# Patient Record
Sex: FEMALE | Race: WHITE | URBAN - METROPOLITAN AREA
[De-identification: names, ages, dates, MRNs, and addresses within clinical notes are randomized per-mention and may not be internally consistent; named-entity substitution may affect disease eponyms.]

---

## 2018-03-06 ENCOUNTER — APPOINTMENT (RX ONLY)
Dept: URBAN - METROPOLITAN AREA CLINIC 86 | Facility: CLINIC | Age: 73
Setting detail: DERMATOLOGY
End: 2018-03-06

## 2018-03-06 DIAGNOSIS — L82.1 OTHER SEBORRHEIC KERATOSIS: ICD-10-CM

## 2018-03-06 DIAGNOSIS — L82.0 INFLAMED SEBORRHEIC KERATOSIS: ICD-10-CM

## 2018-03-06 PROBLEM — L85.3 XEROSIS CUTIS: Status: ACTIVE | Noted: 2018-03-06

## 2018-03-06 PROBLEM — E78.5 HYPERLIPIDEMIA, UNSPECIFIED: Status: ACTIVE | Noted: 2018-03-06

## 2018-03-06 PROBLEM — E13.9 OTHER SPECIFIED DIABETES MELLITUS WITHOUT COMPLICATIONS: Status: ACTIVE | Noted: 2018-03-06

## 2018-03-06 PROBLEM — L20.84 INTRINSIC (ALLERGIC) ECZEMA: Status: ACTIVE | Noted: 2018-03-06

## 2018-03-06 PROCEDURE — 17110 DESTRUCTION B9 LES UP TO 14: CPT

## 2018-03-06 PROCEDURE — ? LIQUID NITROGEN

## 2018-03-06 PROCEDURE — 99202 OFFICE O/P NEW SF 15 MIN: CPT | Mod: 25

## 2018-03-06 PROCEDURE — ? COUNSELING

## 2018-03-06 ASSESSMENT — LOCATION ZONE DERM
LOCATION ZONE: ARM
LOCATION ZONE: FACE
LOCATION ZONE: FACE

## 2018-03-06 ASSESSMENT — LOCATION SIMPLE DESCRIPTION DERM
LOCATION SIMPLE: LEFT UPPER ARM
LOCATION SIMPLE: LEFT CHEEK
LOCATION SIMPLE: LEFT CHEEK

## 2018-03-06 ASSESSMENT — LOCATION DETAILED DESCRIPTION DERM
LOCATION DETAILED: LEFT INFERIOR LATERAL MALAR CHEEK
LOCATION DETAILED: LEFT ANTERIOR PROXIMAL UPPER ARM
LOCATION DETAILED: LEFT INFERIOR MEDIAL MALAR CHEEK

## 2018-03-28 ENCOUNTER — APPOINTMENT (RX ONLY)
Dept: URBAN - METROPOLITAN AREA CLINIC 86 | Facility: CLINIC | Age: 73
Setting detail: DERMATOLOGY
End: 2018-03-28

## 2018-03-28 DIAGNOSIS — L57.0 ACTINIC KERATOSIS: ICD-10-CM

## 2018-03-28 DIAGNOSIS — L82.1 OTHER SEBORRHEIC KERATOSIS: ICD-10-CM

## 2018-03-28 DIAGNOSIS — D22 MELANOCYTIC NEVI: ICD-10-CM

## 2018-03-28 DIAGNOSIS — L85.3 XEROSIS CUTIS: ICD-10-CM

## 2018-03-28 DIAGNOSIS — D18.0 HEMANGIOMA: ICD-10-CM

## 2018-03-28 DIAGNOSIS — L81.4 OTHER MELANIN HYPERPIGMENTATION: ICD-10-CM

## 2018-03-28 PROBLEM — D22.5 MELANOCYTIC NEVI OF TRUNK: Status: ACTIVE | Noted: 2018-03-28

## 2018-03-28 PROBLEM — D18.01 HEMANGIOMA OF SKIN AND SUBCUTANEOUS TISSUE: Status: ACTIVE | Noted: 2018-03-28

## 2018-03-28 PROCEDURE — 17003 DESTRUCT PREMALG LES 2-14: CPT

## 2018-03-28 PROCEDURE — 17000 DESTRUCT PREMALG LESION: CPT

## 2018-03-28 PROCEDURE — 99214 OFFICE O/P EST MOD 30 MIN: CPT | Mod: 25

## 2018-03-28 PROCEDURE — ? COUNSELING

## 2018-03-28 PROCEDURE — ? LIQUID NITROGEN

## 2018-03-28 ASSESSMENT — LOCATION SIMPLE DESCRIPTION DERM
LOCATION SIMPLE: RIGHT CHEEK
LOCATION SIMPLE: RIGHT SHOULDER
LOCATION SIMPLE: CHEST
LOCATION SIMPLE: ABDOMEN
LOCATION SIMPLE: LEFT LIP
LOCATION SIMPLE: LEFT CHEEK
LOCATION SIMPLE: LEFT PRETIBIAL REGION
LOCATION SIMPLE: RIGHT PRETIBIAL REGION

## 2018-03-28 ASSESSMENT — LOCATION DETAILED DESCRIPTION DERM
LOCATION DETAILED: RIGHT PROXIMAL PRETIBIAL REGION
LOCATION DETAILED: EPIGASTRIC SKIN
LOCATION DETAILED: RIGHT CENTRAL MALAR CHEEK
LOCATION DETAILED: RIGHT MEDIAL SUPERIOR CHEST
LOCATION DETAILED: RIGHT ANTERIOR SHOULDER
LOCATION DETAILED: PERIUMBILICAL SKIN
LOCATION DETAILED: LEFT SUPERIOR LATERAL BUCCAL CHEEK
LOCATION DETAILED: UMBILICUS
LOCATION DETAILED: LEFT PROXIMAL PRETIBIAL REGION
LOCATION DETAILED: LEFT UPPER CUTANEOUS LIP

## 2018-03-28 ASSESSMENT — LOCATION ZONE DERM
LOCATION ZONE: TRUNK
LOCATION ZONE: ARM
LOCATION ZONE: LIP
LOCATION ZONE: FACE
LOCATION ZONE: LEG

## 2018-04-27 ENCOUNTER — IMPORTED ENCOUNTER (OUTPATIENT)
Dept: URBAN - METROPOLITAN AREA CLINIC 50 | Facility: CLINIC | Age: 73
End: 2018-04-27

## 2018-05-02 ENCOUNTER — IMPORTED ENCOUNTER (OUTPATIENT)
Dept: URBAN - METROPOLITAN AREA CLINIC 50 | Facility: CLINIC | Age: 73
End: 2018-05-02

## 2018-05-04 ENCOUNTER — IMPORTED ENCOUNTER (OUTPATIENT)
Dept: URBAN - METROPOLITAN AREA CLINIC 50 | Facility: CLINIC | Age: 73
End: 2018-05-04

## 2018-11-09 ENCOUNTER — IMPORTED ENCOUNTER (OUTPATIENT)
Dept: URBAN - METROPOLITAN AREA CLINIC 50 | Facility: CLINIC | Age: 73
End: 2018-11-09

## 2019-05-24 ENCOUNTER — IMPORTED ENCOUNTER (OUTPATIENT)
Dept: URBAN - METROPOLITAN AREA CLINIC 50 | Facility: CLINIC | Age: 74
End: 2019-05-24

## 2019-05-24 NOTE — PATIENT DISCUSSION
"""Dr. Paralee Baumgarten to review test results with patient at todays appoinment.  IOP controlled OU

## 2020-05-29 ENCOUNTER — IMPORTED ENCOUNTER (OUTPATIENT)
Dept: URBAN - METROPOLITAN AREA CLINIC 50 | Facility: CLINIC | Age: 75
End: 2020-05-29

## 2021-04-17 ASSESSMENT — VISUAL ACUITY
OS_OTHER: 20/50. 20/200.
OD_CC: J1+@ 16 IN
OD_OTHER: 20/60. 20/70.
OS_CC: J1+@ 15 IN
OS_PH: 20/40-1
OS_CC: J1+@ 14 IN
OD_CC: J1+@ 15 IN
OD_CC: 20/25-3
OD_OTHER: 20/400. 20/>400.
OS_BAT: 20/50
OD_CC: 20/40-2
OS_OTHER: 20/50. 20/70.
OD_CC: 20/30-2
OD_OTHER: 20/60. >20/400.
OS_CC: 20/40+2
OD_BAT: 20/60
OS_CC: 20/40
OD_CC: 20/30
OD_CC: J1+@ 14 IN
OD_PH: 20/40
OS_CC: 20/40+2
OS_BAT: 20/50
OS_CC: J1+@ 16 IN
OD_BAT: 20/400
OD_BAT: 20/60
OS_CC: 20/40-1

## 2021-04-17 ASSESSMENT — PACHYMETRY
OS_CT_UM: 652
OD_CT_UM: 632
OS_CT_UM: 652
OD_CT_UM: 632

## 2021-04-17 ASSESSMENT — TONOMETRY
OD_IOP_MMHG: 16
OS_IOP_MMHG: 12
OD_IOP_MMHG: 15
OS_IOP_MMHG: 16
OD_IOP_MMHG: 14
OS_IOP_MMHG: 18
OS_IOP_MMHG: 17
OD_IOP_MMHG: 10
OS_IOP_MMHG: 13
OD_IOP_MMHG: 20
OS_IOP_MMHG: 14
OD_IOP_MMHG: 19
OD_IOP_MMHG: 12
OS_IOP_MMHG: 21

## 2021-06-03 ENCOUNTER — PREPPED CHART (OUTPATIENT)
Dept: URBAN - METROPOLITAN AREA CLINIC 50 | Facility: CLINIC | Age: 76
End: 2021-06-03

## 2021-06-03 NOTE — PATIENT DISCUSSION
"""Reassured patient that intraocular pressures (IOPs) are at target levels and other ocular findings are stable. Continue present management and/or medication(s). Follow up as directed.  ""."

## 2022-03-18 ENCOUNTER — ESTABLISHED PATIENT (OUTPATIENT)
Dept: URBAN - METROPOLITAN AREA CLINIC 50 | Facility: CLINIC | Age: 77
End: 2022-03-18

## 2022-03-18 DIAGNOSIS — E11.9: ICD-10-CM

## 2022-03-18 DIAGNOSIS — H52.4: ICD-10-CM

## 2022-03-18 DIAGNOSIS — H26.491: ICD-10-CM

## 2022-03-18 DIAGNOSIS — H43.811: ICD-10-CM

## 2022-03-18 DIAGNOSIS — H35.372: ICD-10-CM

## 2022-03-18 DIAGNOSIS — H40.023: ICD-10-CM

## 2022-03-18 PROCEDURE — 92134 CPTRZ OPH DX IMG PST SGM RTA: CPT

## 2022-03-18 PROCEDURE — 92015 DETERMINE REFRACTIVE STATE: CPT

## 2022-03-18 PROCEDURE — 92014 COMPRE OPH EXAM EST PT 1/>: CPT

## 2022-03-18 ASSESSMENT — TONOMETRY
OD_IOP_MMHG: 13
OS_IOP_MMHG: 13
OD_IOP_MMHG: 17
OS_IOP_MMHG: 17

## 2022-03-18 ASSESSMENT — VISUAL ACUITY
OU_CC: 20/30-2
OD_GLARE: 20/60
OS_CC: 20/30-1
OD_CC: 20/30
OU_CC: J1@14IN
OD_GLARE: >20/400

## 2022-06-16 NOTE — PATIENT DISCUSSION
Continue post op drops as per instruction sheet. Do not rub or get water in eye. Reviewed endophthalmitis precautions. All questions answered.

## 2022-06-16 NOTE — PATIENT DISCUSSION
Doing well. Mild dry eye - patient ed can use Refresh ATS (sample given) prn for relief, do not use at same time as Rx PO drops.

## 2022-06-30 NOTE — PATIENT DISCUSSION
Good postoperative appearance, informed patient that pressure sensation is normal after CAT SX and cold compress may help but should improve in a day or two.

## 2022-07-13 NOTE — PATIENT DISCUSSION
Continue with post-operative drops until completed. New drop calendar given. STOP ofloxacin and ketorolac. START pred taper today.

## 2022-07-13 NOTE — PATIENT DISCUSSION
Artificial Tears: One drop to both eyes 1-4 times daily. We recommend Systane, Theratears or Refresh lubricating eye drops which can be found at any pharmacy.